# Patient Record
Sex: FEMALE | Race: WHITE | NOT HISPANIC OR LATINO | Employment: FULL TIME | URBAN - METROPOLITAN AREA
[De-identification: names, ages, dates, MRNs, and addresses within clinical notes are randomized per-mention and may not be internally consistent; named-entity substitution may affect disease eponyms.]

---

## 2017-06-05 ENCOUNTER — ALLSCRIPTS OFFICE VISIT (OUTPATIENT)
Dept: OTHER | Facility: OTHER | Age: 67
End: 2017-06-05

## 2018-01-11 NOTE — RESULT NOTES
Verified Results  (1) COMPREHENSIVE METABOLIC PANEL [id number] 25:29AD Teresita Meza   REPORT COMMENT:  FASTING:YES     Test Name Result Flag Reference   GLUCOSE 92 mg/dL  65-99   Fasting reference interval   UREA NITROGEN (BUN) 14 mg/dL  7-25   CREATININE 0 70 mg/dL  0 50-0 99   For patients >52years of age, the reference limit  for Creatinine is approximately 13% higher for people  identified as -American  eGFR NON-AFR  AMERICAN 92 mL/min/1 73m2  > OR = 60   eGFR AFRICAN AMERICAN 106 mL/min/1 73m2  > OR = 60   BUN/CREATININE RATIO   8-42   NOT APPLICABLE (calc)   SODIUM 140 mmol/L  135-146   POTASSIUM 4 2 mmol/L  3 5-5 3   CHLORIDE 104 mmol/L     CARBON DIOXIDE 27 mmol/L  20-31   CALCIUM 9 1 mg/dL  8 6-10 4   PROTEIN, TOTAL 6 4 g/dL  6 1-8 1   ALBUMIN 3 9 g/dL  3 6-5 1   GLOBULIN 2 5 g/dL (calc)  1 9-3 7   ALBUMIN/GLOBULIN RATIO 1 6 (calc)  1 0-2 5   BILIRUBIN, TOTAL 0 5 mg/dL  0 2-1 2   ALKALINE PHOSPHATASE 106 U/L     AST 19 U/L  10-35   ALT 23 U/L  6-29     (1) LIPID PANEL, FASTING 29Oct2016 12:00PM Teresita Meza     Test Name Result Flag Reference   CHOLESTEROL, TOTAL 217 mg/dL H 125-200   HDL CHOLESTEROL 67 mg/dL  > OR = 46   TRIGLICERIDES 81 mg/dL  <665   LDL-CHOLESTEROL 134 mg/dL (calc) H <130   Desirable range <100 mg/dL for patients with CHD or  diabetes and <70 mg/dL for diabetic patients with  known heart disease  CHOL/HDLC RATIO 3 2 (calc)  < OR = 5 0   NON HDL CHOLESTEROL 150 mg/dL (calc)     Target for non-HDL cholesterol is 30 mg/dL higher than   LDL cholesterol target  See Below     We received your handwritten test order and  performed the AMA defined lipid panel  If  this is not what you intended to order, please  contact your local   immediately so that we may adjust our billing  appropriately  You may also inquire about  alternative or additional testing

## 2018-01-13 VITALS
DIASTOLIC BLOOD PRESSURE: 82 MMHG | SYSTOLIC BLOOD PRESSURE: 130 MMHG | WEIGHT: 247.38 LBS | HEIGHT: 61 IN | HEART RATE: 84 BPM | TEMPERATURE: 98.6 F | RESPIRATION RATE: 16 BRPM | BODY MASS INDEX: 46.71 KG/M2

## 2018-02-05 RX ORDER — MELOXICAM 15 MG/1
TABLET ORAL
Qty: 30 TABLET | Refills: 1 | OUTPATIENT
Start: 2018-02-05

## 2018-02-05 NOTE — TELEPHONE ENCOUNTER
1st attempt- left message on patients voice mail to return phone call regarding rx refill and who she sees for PCP  Patient's info states Dr Marco A Baum

## 2018-02-07 DIAGNOSIS — G89.29 OTHER CHRONIC PAIN: Primary | ICD-10-CM

## 2018-02-07 RX ORDER — MELOXICAM 15 MG/1
15 TABLET ORAL DAILY PRN
Qty: 30 TABLET | Refills: 0 | Status: SHIPPED | OUTPATIENT
Start: 2018-02-07 | End: 2018-02-13 | Stop reason: SDUPTHER

## 2018-02-07 RX ORDER — MELOXICAM 15 MG/1
15 TABLET ORAL DAILY PRN
Refills: 1 | COMMUNITY
Start: 2018-01-05 | End: 2018-02-07 | Stop reason: SDUPTHER

## 2018-02-07 NOTE — TELEPHONE ENCOUNTER
Patient called back stating that she is still taking the Meloxicam and would like refill  I scheduled patient appt on 3/1/18 with you

## 2018-02-07 NOTE — TELEPHONE ENCOUNTER
2nd attempt- left message on patients voice mail to return phone call regarding medication refill and schedule appt

## 2018-02-13 ENCOUNTER — OFFICE VISIT (OUTPATIENT)
Dept: FAMILY MEDICINE CLINIC | Facility: CLINIC | Age: 68
End: 2018-02-13
Payer: COMMERCIAL

## 2018-02-13 VITALS
TEMPERATURE: 98.7 F | DIASTOLIC BLOOD PRESSURE: 96 MMHG | SYSTOLIC BLOOD PRESSURE: 154 MMHG | OXYGEN SATURATION: 98 % | BODY MASS INDEX: 46.03 KG/M2 | WEIGHT: 243.8 LBS | HEIGHT: 61 IN | HEART RATE: 88 BPM

## 2018-02-13 DIAGNOSIS — G89.29 OTHER CHRONIC PAIN: ICD-10-CM

## 2018-02-13 DIAGNOSIS — I10 ESSENTIAL HYPERTENSION: ICD-10-CM

## 2018-02-13 DIAGNOSIS — M25.561 ARTHRALGIA OF RIGHT KNEE: ICD-10-CM

## 2018-02-13 DIAGNOSIS — J20.9 BRONCHOSPASM WITH BRONCHITIS, ACUTE: Primary | ICD-10-CM

## 2018-02-13 PROBLEM — IMO0001 CLASS 3 OBESITY WITHOUT SERIOUS COMORBIDITY WITH BODY MASS INDEX (BMI) OF 40.0 TO 44.9 IN ADULT: Status: ACTIVE | Noted: 2018-02-13

## 2018-02-13 PROCEDURE — 99214 OFFICE O/P EST MOD 30 MIN: CPT | Performed by: FAMILY MEDICINE

## 2018-02-13 PROCEDURE — 3008F BODY MASS INDEX DOCD: CPT | Performed by: FAMILY MEDICINE

## 2018-02-13 RX ORDER — MELOXICAM 15 MG/1
1 TABLET ORAL DAILY PRN
COMMUNITY
Start: 2016-10-07 | End: 2018-02-13

## 2018-02-13 RX ORDER — GABAPENTIN 100 MG/1
1 CAPSULE ORAL 3 TIMES DAILY PRN
COMMUNITY
Start: 2015-09-01

## 2018-02-13 RX ORDER — MELOXICAM 15 MG/1
15 TABLET ORAL DAILY PRN
Qty: 30 TABLET | Refills: 0 | Status: SHIPPED | OUTPATIENT
Start: 2018-02-13 | End: 2018-03-01 | Stop reason: ALTCHOICE

## 2018-02-13 RX ORDER — AZITHROMYCIN 250 MG/1
TABLET, FILM COATED ORAL
Qty: 6 TABLET | Refills: 0 | Status: SHIPPED | OUTPATIENT
Start: 2018-02-13 | End: 2018-02-18

## 2018-02-13 RX ORDER — BENAZEPRIL HYDROCHLORIDE AND HYDROCHLOROTHIAZIDE 20; 12.5 MG/1; MG/1
1 TABLET ORAL DAILY
COMMUNITY
Start: 2013-11-05 | End: 2018-03-26 | Stop reason: SDUPTHER

## 2018-02-13 NOTE — PATIENT INSTRUCTIONS
She has 10 days of congestion, increased wheezing here today with no prior history reactive airway disease, nonsmoker  We will cover with azithromycin, use Mucinex every 12 hours, continue with humidity /team at home, use nasal saline, deep breathing  We did discuss adding an  inhaler for bronchodilation which she has never used before, she wishes to hold off on this  If she would develop a fever or if she is not improved in 3 days I would like her to get a chest x-ray  I did give slip for fasting blood work along with urinalysis to do before her up coming scheduled appointment with us  Blood pressure is not ideal here today, is elevated  She has been using benazepril 1/2 pill, I would like her to use 1 whole pill daily  I did refill her meloxicam here today, she uses this few times a week for knee pain  Was OOW 2 days last week w this-   I want her out tomorrow also

## 2018-02-13 NOTE — PROGRESS NOTES
FAMILY PRACTICE OFFICE VISIT      Jovanna CASTILLO O  [address] [place] [address]    [id number]  [address]  [place], [id number]      NAME: Jaime Prince  AGE: 79 y o  SEX: female  : 1950   MRN: [id number]    DATE: 2018  TIME: 5:41 PM    Assessment and Plan     Problem List Items Addressed This Visit     Joint pain, knee    Hypertension    Relevant Medications    benazepril-hydrochlorthiazide (LOTENSIN HCT) 20-12 5 MG per tablet    Other Relevant Orders    CBC and differential    Comprehensive metabolic panel    Lipid panel    TSH, 3rd generation with T4 reflex    Urinalysis with microscopic      Other Visit Diagnoses     Bronchospasm with bronchitis, acute    -  Primary    Relevant Medications    azithromycin (ZITHROMAX) 250 mg tablet    Other Relevant Orders    XR chest pa & lateral    Other chronic pain        Relevant Medications    meloxicam (MOBIC) 15 mg tablet            Patient Instructions    She has 10 days of congestion, increased wheezing here today with no prior history reactive airway disease, nonsmoker  We will cover with azithromycin, use Mucinex every 12 hours, continue with humidity /team at home, use nasal saline, deep breathing  We did discuss adding an  inhaler for bronchodilation which she has never used before, she wishes to hold off on this  If she would develop a fever or if she is not improved in 3 days I would like her to get a chest x-ray  I did give slip for fasting blood work along with urinalysis to do before her up coming scheduled appointment with us  Blood pressure is not ideal here today, is elevated  She has been using benazepril 1/2 pill, I would like her to use 1 whole pill daily  I did refill her meloxicam here today, she uses this few times a week for knee pain  Was OOW 2 days last week w this-   I want her out tomorrow also            Chief Complaint     Chief Complaint   Patient presents with    Cough     x 10 days        History of Present Illness     Logan Dunham is a 79y o -year-old female who Was feeling okay until about 10 days ago on set upper congestion which continues to spike using over-the-counter Robitussin/ Tylenol Sinus  She has no history of asthma or reactive airway disease, no past history of pneumonia, she is a nonsmoker  Works FT as  in [address]  She does use meloxicam every other day or so for knee pain, desires a refill, no stomach upset with this  She is overdue for routine check but relates she is scheduled for March 1st with us  She has been using benazepril hydrochlorothiazide 1/2 pill daily, blood pressure not ideal here today    Only uses Gabapentin as needed      Review of Systems     Review of Systems   Constitutional: Negative for chills, diaphoresis, fatigue and fever  HENT: Positive for congestion, postnasal drip, rhinorrhea and sinus pressure  Negative for ear pain, facial swelling, hearing loss, mouth sores, nosebleeds, sneezing, sore throat and trouble swallowing  Eyes: Negative for pain and discharge  Respiratory: Positive for cough and wheezing  Negative for shortness of breath  Cardiovascular: Negative for chest pain  Gastrointestinal: Negative for abdominal pain, nausea and vomiting  Genitourinary: Negative for difficulty urinating  Musculoskeletal: Positive for arthralgias  Skin: Negative for rash  Neurological: Negative for dizziness, syncope, weakness, light-headedness and headaches  Hematological: Negative for adenopathy  Does not bruise/bleed easily         Active Problem List     Patient Active Problem List   Diagnosis    Allergic rhinitis    Tear of biceps muscle, left, initial encounter    Peripheral neuropathy    OA (osteoarthritis) of finger, left    Joint pain, knee    Hypertension    Heart murmur    Class 3 obesity without serious comorbidity with body mass index (BMI) of 40 0 to 44 9 in adult [facility])       Past Medical History:  No past medical history on file  Past Surgical History:  No past surgical history on file  Family History:  No family history on file  Social History:  Social History     Social History    Marital status:      Spouse name: N/A    Number of children: N/A    Years of education: N/A     Occupational History    Not on file  Social History Main Topics    Smoking status: Never Smoker    Smokeless tobacco: Never Used    Alcohol use No    Drug use: No    Sexual activity: Not on file     Other Topics Concern    Not on file     Social History Narrative    No narrative on file       Objective     Vitals:    02/13/18 1646   BP: 154/96   Pulse: 88   Temp: 98 7 °F (37 1 °C)   SpO2: 98%   Weight: 111 kg (243 lb 12 8 oz)   Height: 5' 1" (1 549 m)     Body mass index is 46 07 kg/m²  BP Readings from Last 3 Encounters:   02/13/18 154/96   06/05/17 130/82   11/16/16 120/80       Wt Readings from Last 3 Encounters:   02/13/18 111 kg (243 lb 12 8 oz)   06/05/17 112 kg (247 lb 6 oz)   11/16/16 110 kg (243 lb 6 oz)       Physical Exam   Constitutional: She is oriented to person, place, and time  She appears well-developed and well-nourished  No distress  Obese afebrile   HENT:   Head: Normocephalic and atraumatic  Mouth/Throat: Oropharynx is clear and moist  No oropharyngeal exudate  Eyes: Conjunctivae are normal  Scleral icterus is present  Cardiovascular: Normal rate and regular rhythm  Murmur (1/6 systolic ejection murmur right 2nd intercostal space) heard  Pulmonary/Chest: Effort normal  No respiratory distress  She has wheezes  Abdominal: There is no tenderness  Musculoskeletal: She exhibits edema  Lymphadenopathy:     She has no cervical adenopathy  Neurological: She is alert and oriented to person, place, and time  Skin: She is not diaphoretic  Psychiatric: She has a normal mood and affect   Her behavior is normal  Judgment and thought content normal        Pertinent Laboratory/Diagnostic Studies:  Results for orders placed or performed in visit on 10/07/16   COMPREHENSIVE METABOLIC PANEL (HISTORICAL)   Result Value Ref Range    Glucose 92 65 - 99 mg/dL    BUN 14 7 - 25 mg/dL    Creatinine 0 70 0 50 - 0 99 mg/dL    EGFR-AMERICAN CALC 92 > OR = 60 mL/min/1 73m2    eGFR  106 > OR = 60 mL/min/1 73m2    BUN/CREA Ratio NOT APPLICABLE 6 - 22 (calc)    Sodium 140 135 - 146 mmol/L    Potassium 4 2 3 5 - 5 3 mmol/L    Chloride 104 98 - 110 mmol/L    CO2 27 20 - 31 mmol/L    Calcium 9 1 8 6 - 10 4 mg/dL    Total Protein 6 4 6 1 - 8 1 g/dL    Albumin 3 9 3 6 - 5 1 g/dL    GAMMA GLOBULIN 2 5 1 9 - 3 7 g/dL (calc)    A/G RATIO 1 6 1 0 - 2 5 (calc)    Total Bilirubin 0 5 0 2 - 1 2 mg/dL    Alkaline Phosphatase 106 33 - 130 U/L    AST 19 10 - 35 U/L    ALT 23 6 - 29 U/L         ALLERGIES:  No Known Allergies    Current Medications     Current Outpatient Prescriptions   Medication Sig Dispense Refill    benazepril-hydrochlorthiazide (LOTENSIN HCT) 20-12 5 MG per tablet Take 1 tablet by mouth daily      gabapentin (NEURONTIN) 100 mg capsule Take 1 capsule by mouth 3 (three) times a day as needed      meloxicam (MOBIC) 15 mg tablet Take 1 tablet (15 mg total) by mouth daily as needed for moderate pain 30 tablet 0    azithromycin (ZITHROMAX) 250 mg tablet 2 today, 1 daily x 4 days after 6 tablet 0     No current facility-administered medications for this visit            Health Maintenance     Orders Placed This Encounter   Procedures    XR chest pa & lateral    CBC and differential    Comprehensive metabolic panel    Lipid panel    TSH, 3rd generation with T4 reflex    Urinalysis with microscopic         Marcella Bernarda,

## 2018-02-18 LAB
ALBUMIN SERPL-MCNC: 3.7 G/DL (ref 3.6–5.1)
ALBUMIN/GLOB SERPL: 1.4 (CALC) (ref 1–2.5)
ALP SERPL-CCNC: 101 U/L (ref 33–130)
ALT SERPL-CCNC: 22 U/L (ref 6–29)
APPEARANCE UR: CLEAR
AST SERPL-CCNC: 17 U/L (ref 10–35)
BACTERIA UR QL AUTO: ABNORMAL /HPF
BASOPHILS # BLD AUTO: 38 CELLS/UL (ref 0–200)
BASOPHILS NFR BLD AUTO: 0.5 %
BILIRUB SERPL-MCNC: 0.4 MG/DL (ref 0.2–1.2)
BILIRUB UR QL STRIP: NEGATIVE
BUN SERPL-MCNC: 20 MG/DL (ref 7–25)
BUN/CREAT SERPL: NORMAL (CALC) (ref 6–22)
CALCIUM SERPL-MCNC: 9.3 MG/DL (ref 8.6–10.4)
CHLORIDE SERPL-SCNC: 101 MMOL/L (ref 98–110)
CHOLEST SERPL-MCNC: 196 MG/DL
CHOLEST/HDLC SERPL: 3.6 (CALC)
CO2 SERPL-SCNC: 26 MMOL/L (ref 20–31)
COLOR UR: YELLOW
CREAT SERPL-MCNC: 0.81 MG/DL (ref 0.5–0.99)
EOSINOPHIL # BLD AUTO: 258 CELLS/UL (ref 15–500)
EOSINOPHIL NFR BLD AUTO: 3.4 %
ERYTHROCYTE [DISTWIDTH] IN BLOOD BY AUTOMATED COUNT: 12.1 % (ref 11–15)
GLOBULIN SER CALC-MCNC: 2.7 G/DL (CALC) (ref 1.9–3.7)
GLUCOSE SERPL-MCNC: 96 MG/DL (ref 65–99)
GLUCOSE UR QL STRIP: NEGATIVE
HCT VFR BLD AUTO: 43.8 % (ref 35–45)
HDLC SERPL-MCNC: 55 MG/DL
HGB BLD-MCNC: 14.8 G/DL (ref 11.7–15.5)
HGB UR QL STRIP: NEGATIVE
HYALINE CASTS #/AREA URNS LPF: ABNORMAL /LPF
KETONES UR QL STRIP: NEGATIVE
LDLC SERPL CALC-MCNC: 122 MG/DL (CALC)
LEUKOCYTE ESTERASE UR QL STRIP: ABNORMAL
LYMPHOCYTES # BLD AUTO: 1938 CELLS/UL (ref 850–3900)
LYMPHOCYTES NFR BLD AUTO: 25.5 %
MCH RBC QN AUTO: 31.7 PG (ref 27–33)
MCHC RBC AUTO-ENTMCNC: 33.8 G/DL (ref 32–36)
MCV RBC AUTO: 93.8 FL (ref 80–100)
MONOCYTES # BLD AUTO: 669 CELLS/UL (ref 200–950)
MONOCYTES NFR BLD AUTO: 8.8 %
NEUTROPHILS # BLD AUTO: 4697 CELLS/UL (ref 1500–7800)
NEUTROPHILS NFR BLD AUTO: 61.8 %
NITRITE UR QL STRIP: NEGATIVE
NONHDLC SERPL-MCNC: 141 MG/DL (CALC)
PH UR STRIP: 7 [PH] (ref 5–8)
PLATELET # BLD AUTO: 290 THOUSAND/UL (ref 140–400)
PMV BLD REES-ECKER: 9.4 FL (ref 7.5–12.5)
POTASSIUM SERPL-SCNC: 3.9 MMOL/L (ref 3.5–5.3)
PROT SERPL-MCNC: 6.4 G/DL (ref 6.1–8.1)
PROT UR QL STRIP: NEGATIVE
RBC # BLD AUTO: 4.67 MILLION/UL (ref 3.8–5.1)
RBC #/AREA URNS HPF: ABNORMAL /HPF
SL AMB EGFR AFRICAN AMERICAN: 88 ML/MIN/1.73M2
SL AMB EGFR NON AFRICAN AMERICAN: 76 ML/MIN/1.73M2
SODIUM SERPL-SCNC: 137 MMOL/L (ref 135–146)
SP GR UR STRIP: 1.01 (ref 1–1.03)
SQUAMOUS #/AREA URNS HPF: ABNORMAL /HPF
TRIGL SERPL-MCNC: 90 MG/DL
TSH SERPL-ACNC: 1.92 MIU/L (ref 0.4–4.5)
WBC # BLD AUTO: 7.6 THOUSAND/UL (ref 3.8–10.8)
WBC #/AREA URNS HPF: ABNORMAL /HPF

## 2018-02-20 ENCOUNTER — TELEPHONE (OUTPATIENT)
Dept: FAMILY MEDICINE CLINIC | Facility: CLINIC | Age: 68
End: 2018-02-20

## 2018-02-20 NOTE — TELEPHONE ENCOUNTER
Notify her I reviewed our recent visit, I gave her a prescription for meloxicam, if this is not helping she would need a re-evaluation, muscle relaxant for leg pain with not be appropriate, she does have upcoming appointment March 1st with Dr Dick Barrier

## 2018-02-20 NOTE — TELEPHONE ENCOUNTER
Patient called to the office this am c/o right leg pain- knee and thigh (unsure for how long patient is having this pain since I did not speak with patient)  Patient is requesting for a muscle relaxer medication   Yosi Gayle offered her an appt, patient declined, "can't come in"  Please advise  Thanks

## 2018-02-21 ENCOUNTER — OFFICE VISIT (OUTPATIENT)
Dept: FAMILY MEDICINE CLINIC | Facility: CLINIC | Age: 68
End: 2018-02-21

## 2018-02-21 VITALS
RESPIRATION RATE: 20 BRPM | SYSTOLIC BLOOD PRESSURE: 144 MMHG | TEMPERATURE: 98.7 F | BODY MASS INDEX: 45.27 KG/M2 | WEIGHT: 239.6 LBS | DIASTOLIC BLOOD PRESSURE: 86 MMHG | HEART RATE: 80 BPM

## 2018-02-21 DIAGNOSIS — M25.561 ARTHRALGIA OF RIGHT KNEE: Primary | ICD-10-CM

## 2018-02-21 PROCEDURE — 20610 DRAIN/INJ JOINT/BURSA W/O US: CPT | Performed by: FAMILY MEDICINE

## 2018-02-21 PROCEDURE — 99213 OFFICE O/P EST LOW 20 MIN: CPT | Performed by: FAMILY MEDICINE

## 2018-02-21 RX ORDER — LIDOCAINE HYDROCHLORIDE 10 MG/ML
3 INJECTION, SOLUTION INFILTRATION; PERINEURAL
Status: COMPLETED | OUTPATIENT
Start: 2018-02-21 | End: 2018-02-21

## 2018-02-21 RX ORDER — TRIAMCINOLONE ACETONIDE 40 MG/ML
40 INJECTION, SUSPENSION INTRA-ARTICULAR; INTRAMUSCULAR
Status: COMPLETED | OUTPATIENT
Start: 2018-02-21 | End: 2018-02-21

## 2018-02-21 RX ADMIN — TRIAMCINOLONE ACETONIDE 40 MG: 40 INJECTION, SUSPENSION INTRA-ARTICULAR; INTRAMUSCULAR at 10:35

## 2018-02-21 RX ADMIN — LIDOCAINE HYDROCHLORIDE 3 ML: 10 INJECTION, SOLUTION INFILTRATION; PERINEURAL at 10:35

## 2018-02-21 NOTE — PROGRESS NOTES
Assessment/Plan:    Joint pain, knee  The patient has an extensive history of a patellar fracture status post surgery and she does have acute on chronic pain today, she had a small right-sided joint effusion, she was injected with 40 mg of Kenalog and 3 mg of 1% lidocaine, and we will refer her to physical therapy  I did advise her to stop taking Motrin and try Mobic once daily with a meal, she has this at home  For now I'd like to re-evaluate her in about 1 week and we will keep her out of work per her preference because of her inability to perform her job duties until she is re-evaluated  I did tell the patient that in order for us to keep her out from work I believe that she ought to participate in physical therapy, and based on their evaluation and the progress she is able to make we can attempt to determine her recovery course   Diagnoses and all orders for this visit:    Arthralgia of right knee  -     Large joint arthrocentesis  -     Ambulatory referral to Physical Therapy; Future          Subjective:      Patient ID: Angela Age is a 79 y o  female  Knee Pain    The incident occurred 2 days ago (acute on chronic)  The pain is present in the right knee (Radiating to R thigh)  The quality of the pain is described as aching, cramping and shooting  The pain is moderate  Associated symptoms include an inability to bear weight  Pertinent negatives include no loss of sensation, muscle weakness, numbness or tingling  She reports no foreign bodies present  The symptoms are aggravated by movement and weight bearing  Treatments tried: mobic  The patient reports she is here to be written out of work for 3-6 weeks so that she can get better   She is very resistant to considering participating in physical therapy but is willing to have her knee injected--her last injection was several years ago with her orthopedist  She does have a history of patellar fracture which was surgically repaired and she is worried that this is part of the underlying problem  During her history giving she is very anxious and tearful at times  The following portions of the patient's history were reviewed and updated as appropriate: allergies, current medications, past family history, past medical history, past social history, past surgical history and problem list     Review of Systems   Constitutional: Positive for activity change and fatigue  HENT:        Resolving bronchitis   Respiratory: Negative for shortness of breath and wheezing  Cardiovascular: Negative for chest pain and palpitations  Gastrointestinal: Negative  Musculoskeletal: Positive for arthralgias, gait problem and joint swelling  Neurological: Negative for dizziness, tingling, light-headedness and numbness  Psychiatric/Behavioral:        Tearful and anxious         Objective:      /86   Pulse 80   Temp 98 7 °F (37 1 °C)   Resp 20   Wt 109 kg (239 lb 9 6 oz)   BMI 45 27 kg/m²          Physical Exam   Constitutional: She is oriented to person, place, and time  She appears well-developed and well-nourished  No distress  Morbidly obese   HENT:   Head: Normocephalic and atraumatic  Mouth/Throat: Oropharynx is clear and moist  No oropharyngeal exudate  Eyes: EOM are normal  Pupils are equal, round, and reactive to light  Neck: Normal range of motion  Cardiovascular: Normal rate, regular rhythm and normal heart sounds  Pulmonary/Chest: Effort normal and breath sounds normal  No respiratory distress  She has no wheezes  Abdominal: Soft  She exhibits no distension  There is no tenderness  There is no rebound and no guarding  Obese   Musculoskeletal:   Very small right knee joint effusion, crepitus with range of motion, range of motion appears to be intact  Tenderness to palpation of right iliotibial band especially at the mid thigh  Neurological: She is alert and oriented to person, place, and time  Skin: Skin is warm and dry  No rash noted  She is not diaphoretic  Well healed surgical scar across right knee   Psychiatric:   Anxious, tearful   Vitals reviewed        Large joint arthrocentesis  Date/Time: 2/21/2018 10:35 AM  Consent given by: patient  Site marked: site marked  Timeout: Immediately prior to procedure a time out was called to verify the correct patient, procedure, equipment, support staff and site/side marked as required   Supporting Documentation  Indications: pain and joint swelling   Procedure Details  Location: knee - R knee  Preparation: Patient was prepped and draped in the usual sterile fashion  Needle size: 22 G  Ultrasound guidance: no  Approach: anterolateral  Medications administered: 40 mg triamcinolone acetonide 40 mg/mL; 3 mL lidocaine 1 %    Aspirate amount: 0 mL  Patient tolerance: patient tolerated the procedure well with no immediate complications  Dressing:  Sterile dressing applied

## 2018-02-21 NOTE — PATIENT INSTRUCTIONS
Joint pain, knee  The patient has an extensive history of a patellar fracture status post surgery and she does have acute on chronic pain today, she had a small right-sided joint effusion, she was injected with 40 mg of Kenalog and 3 mg of 1% lidocaine, and we will refer her to physical therapy  I did advise her to stop taking Motrin and try Mobic once daily with a meal, she has this at home  For now I'd like to re-evaluate her in about 1 week and we will keep her out of work per her preference because of her inability to perform her job duties until she is re-evaluated

## 2018-02-21 NOTE — LETTER
February 21, 2018     Patient: Claudine Vu   YOB: 1950   Date of Visit: 2/21/2018       To Whom it May Concern:    Garth Mosher is under my professional care  She was seen in my office on 2/21/2018  She may not return to work until re-evaluated by a physician and having been seen by physical therapy  Next appt to be 3/1/18  If you have any questions or concerns, please don't hesitate to call           Sincerely,          Sarai Pereira MD        CC: No Recipients

## 2018-02-21 NOTE — TELEPHONE ENCOUNTER
Spoke with patient, she made appt to be seen today by Dr Vivi Brownlee to be re eval , patient is aware of Mark's recommendations for her- patient states she is missing a lot of points at work and needs a work excuse

## 2018-02-21 NOTE — ASSESSMENT & PLAN NOTE
The patient has an extensive history of a patellar fracture status post surgery and she does have acute on chronic pain today, she had a small right-sided joint effusion, she was injected with 40 mg of Kenalog and 3 mg of 1% lidocaine, and we will refer her to physical therapy  I did advise her to stop taking Motrin and try Mobic once daily with a meal, she has this at home  For now I'd like to re-evaluate her in about 1 week and we will keep her out of work per her preference because of her inability to perform her job duties until she is re-evaluated

## 2018-02-26 ENCOUNTER — EVALUATION (OUTPATIENT)
Dept: PHYSICAL THERAPY | Facility: OTHER | Age: 68
End: 2018-02-26
Payer: COMMERCIAL

## 2018-02-26 DIAGNOSIS — M25.561 ARTHRALGIA OF RIGHT KNEE: Primary | ICD-10-CM

## 2018-02-26 PROCEDURE — G8979 MOBILITY GOAL STATUS: HCPCS | Performed by: PHYSICAL THERAPIST

## 2018-02-26 PROCEDURE — G8978 MOBILITY CURRENT STATUS: HCPCS | Performed by: PHYSICAL THERAPIST

## 2018-02-26 PROCEDURE — 97163 PT EVAL HIGH COMPLEX 45 MIN: CPT | Performed by: PHYSICAL THERAPIST

## 2018-02-26 NOTE — PROGRESS NOTES
PT Evaluation     Today's date: 2018  Patient name: Mary Murrell  : 1950  MRN: [id number]  Referring provider: Maxx Gore MD  Dx:   Encounter Diagnosis     ICD-10-CM    1  Arthralgia of right knee M25 561 Ambulatory referral to Physical Therapy       Start Time:   Stop Time:   Total time in clinic (min): 60 minutes    Assessment  Impairments: abnormal gait, abnormal or restricted ROM, activity intolerance, impaired balance, impaired physical strength, lacks appropriate home exercise program, pain with function and weight-bearing intolerance    Assessment details: Mary Murrell is a 79 y o  female who presents with arthralgia of right knee  (primary encounter diagnosis)  No further referral appears necessary at this time based upon examination results  Patient presents to PT with impaired strength, impaired ROM, decreased flexibility and impaired ability to complete IADLs  Prognosis is good given HEP compliance and PT 2-3x/wk  Positive prognostic indicators include her desire to return back to work  Please contact me if you have any questions or recommendations  Thank you for the opportunity to share in Charlette's care       Barriers to therapy: depression, attitude, obesity, socioeconomic status, insurance   Understanding of Dx/Px/POC: fair   Prognosis: fair    Goals  Short Term:  Pt will report decreased levels of pain by at least 2 subjective ratings in 4 weeks  Pt will demonstrate improved ROM by at least 10 degrees in 4 weeks  Pt will demonstrate improved strength by 1/2 grade  Long Term:   Pt will be independent in their HEP in 8 weeks  Pt will be be pain free with IADL's  Pt will demonstrate improved FOTO score   Patient's Goal:  "I want to return to work ASAP"    Plan  Planned modality interventions: cryotherapy and TENS  Planned therapy interventions: abdominal trunk stabilization, balance, home exercise program, flexibility, functional ROM exercises, gait training, therapeutic exercise, therapeutic activities, stretching, strengthening, manual therapy and joint mobilization  Frequency: 1x week  Duration in weeks: 4        Subjective Evaluation    History of Present Illness  Date of onset: 2018  Mechanism of injury: Patient reports last Tuesday she was at work and she started to feel pain into the R hip  Patient reports she had a lot of difficulty walking out of her job that day  She has since seen her family doctor  She was given an injection that day  Over the weekend she went to urgent care due to the pain  They performed an x-ray  X-ray came back negative  She has a follow up appointment with MD on Friday  She will be going for more x-rays today at 1:00PM of her hip and low back  She keeps telling PT she is depressed because she is out of work and she has a lot of pain      Not a recurrent problem Quality of life: poor    Pain  Current pain ratin  At best pain ratin  At worst pain rating: 10  Location: right hip   Quality: throbbing, burning and sharp  Relieving factors: rest, heat, ice and change in position  Aggravating factors: sitting, standing, stair climbing, walking, lifting and running      Diagnostic Tests  X-ray: normal  Treatments  Previous treatment: injection treatment and medication  Current treatment: physical therapy  Patient Goals  Patient goals for therapy: decreased edema, decreased pain, improved balance, increased motion, increased strength, return to work and independence with ADLs/IADLs  Patient goal: "I want to return to work ASAP"        Objective     Observations     Additional Observation Details  Ecchymosis noted in the R hip  No known cause for ecchymosis     Palpation     Additional Palpation Details  Tenderness noted in the R SI    Active Range of Motion     Lumbar   Flexion: WFL  Extension: WFL  Left lateral flexion: WFL  Right lateral flexion: WFL  Left rotation: [facility]  Right rotation: [facility]    Additional Active Range of Motion Details  Pain with lateral flexion to the L   Pain with rotation to the L     Passive Range of Motion     Right Knee   Flexion: 110 degrees     Strength/Myotome Testing     Left Hip   Planes of Motion   Flexion: 4  Left hip extension strength: not able to test   Abduction: 4  Adduction: 5    Right Hip   Planes of Motion   Flexion: 3+  Right hip extension strength: not able to test   Abduction: 3+  Adduction: 5    Left Knee   Flexion: 4  Extension: 4    Right Knee   Flexion: 3+  Extension: 3+    Tests     Right Hip   Positive LUIS M and scour  Negative FADIR and piriformis       Flowsheet Rows    Flowsheet Row Most Recent Value   PT/OT G-Codes   Current Score  30   Projected Score  62   FOTO information reviewed  Yes   Assessment Type  Evaluation   G code set  Mobility: Walking & Moving Around   Mobility: Walking and Moving Around Current Status ()  CL   Mobility: Walking and Moving Around Goal Status ()  CK        Precautions: anxiety or depression disorder, arthritis, BMI over 30, HTN, sleep dysfunction    Daily Treatment Diary     Manual  2/26            R hip PROM             Piriformis STM                                                        Exercise Diary  2/26            Robert              SLR abduction             Standing SLR x 3             Hamstring strap stretch                                                                                                                                                                                                                                 Modalities  2/26            Eitan WHITTEN

## 2018-03-01 ENCOUNTER — TELEPHONE (OUTPATIENT)
Dept: INTERNAL MEDICINE CLINIC | Facility: CLINIC | Age: 68
End: 2018-03-01

## 2018-03-01 ENCOUNTER — OFFICE VISIT (OUTPATIENT)
Dept: INTERNAL MEDICINE CLINIC | Facility: CLINIC | Age: 68
End: 2018-03-01
Payer: COMMERCIAL

## 2018-03-01 VITALS
SYSTOLIC BLOOD PRESSURE: 120 MMHG | RESPIRATION RATE: 16 BRPM | BODY MASS INDEX: 44.1 KG/M2 | TEMPERATURE: 98.5 F | WEIGHT: 233.6 LBS | OXYGEN SATURATION: 98 % | HEIGHT: 61 IN | DIASTOLIC BLOOD PRESSURE: 70 MMHG | HEART RATE: 106 BPM

## 2018-03-01 DIAGNOSIS — G60.9 IDIOPATHIC PERIPHERAL NEUROPATHY: Primary | ICD-10-CM

## 2018-03-01 PROCEDURE — 99213 OFFICE O/P EST LOW 20 MIN: CPT | Performed by: INTERNAL MEDICINE

## 2018-03-01 NOTE — LETTER
March 1, 2018     Patient: Kevin Díaz   YOB: 1950   Date of Visit: 3/1/2018       To Whom it May Concern:    Tonny Ellis is under my professional care  She was seen in my office on 3/1/2018  She may return to work with limitations 3/15/18  If you have any questions or concerns, please don't hesitate to call           Sincerely,          Hugo Che,         CC: No Recipients

## 2018-03-01 NOTE — TELEPHONE ENCOUNTER
Could you see If you could get records from [facility] at [facility] for our notes?  Thanks

## 2018-03-05 ENCOUNTER — TELEPHONE (OUTPATIENT)
Dept: INTERNAL MEDICINE CLINIC | Facility: CLINIC | Age: 68
End: 2018-03-05

## 2018-03-05 NOTE — TELEPHONE ENCOUNTER
She got a note for me to be out for 2 weeks  At that point she is suppose to see her PCP because I am not sure what her restrictions will be at that time so I cant really do this at this point  She is not working right now

## 2018-03-06 NOTE — TELEPHONE ENCOUNTER
Patient called back, she stated that she seen a provider at [facility], he wanted to do an injection in her back but patient declined it  Patient stated that she called them this morning for appt to have the injection this afternoon  Advised patient when she is seen today to have the provider send her records to the office  Patient stated that she is not transferring to our office  I advised her we do not have the documentation to fill out any forms for her employer

## 2018-03-06 NOTE — TELEPHONE ENCOUNTER
1st attempt- left message on patients voice mail to return phone call regarding records  Patient needs to call [facility] to give permission to send records

## 2018-03-09 ENCOUNTER — OFFICE VISIT (OUTPATIENT)
Dept: PHYSICAL THERAPY | Facility: OTHER | Age: 68
End: 2018-03-09

## 2018-03-09 NOTE — PROGRESS NOTES
PT performed Initial Evaluation  PT tried to have patient attend several sessions but she was not having it  Patient called to cancel and informed PT that she no longer wants to attend PT because she had a cortisone shot in her R hip which alleviated her pain    Thank you for your referral

## 2018-03-26 DIAGNOSIS — I10 ESSENTIAL HYPERTENSION: Primary | ICD-10-CM

## 2018-03-26 RX ORDER — BENAZEPRIL HYDROCHLORIDE AND HYDROCHLOROTHIAZIDE 20; 12.5 MG/1; MG/1
TABLET ORAL
Qty: 90 TABLET | Refills: 1 | Status: SHIPPED | OUTPATIENT
Start: 2018-03-26 | End: 2018-11-07 | Stop reason: SDUPTHER

## 2018-04-08 DIAGNOSIS — G89.29 OTHER CHRONIC PAIN: ICD-10-CM

## 2018-04-09 RX ORDER — MELOXICAM 15 MG/1
15 TABLET ORAL DAILY PRN
Qty: 30 TABLET | Refills: 0 | Status: SHIPPED | OUTPATIENT
Start: 2018-04-09 | End: 2018-10-03

## 2018-05-22 ENCOUNTER — TELEPHONE (OUTPATIENT)
Dept: FAMILY MEDICINE CLINIC | Facility: CLINIC | Age: 68
End: 2018-05-22

## 2018-05-22 NOTE — TELEPHONE ENCOUNTER
CEASARI: Home care just wanted to let you know that they saw her today for her start of care, since her knee replacement  They will be sending over the orders for you to sign

## 2018-05-23 NOTE — TELEPHONE ENCOUNTER
This therapy is typically initiated by the discharging physician and then she will often need a face-to-face visit to have ongoing care  Please make sure she has a transition of care visit

## 2018-05-23 NOTE — TELEPHONE ENCOUNTER
Called patient she said she did not want to schedule an appointment  She doesn't understand why she needs to come in, when I explained this to her she said she cannot drive right now and she does not have a way there and asked me how she is expected to show up for these appointments  She said she has PT and home care  How would you like to proceed?

## 2018-05-25 NOTE — TELEPHONE ENCOUNTER
PT should be ordered by the physician who performed her knee replacement  We cannot order physical therapy for procedures that we did not perform  Please checking with her performing orthopedist and have their office take care of this

## 2018-10-03 ENCOUNTER — OFFICE VISIT (OUTPATIENT)
Dept: FAMILY MEDICINE CLINIC | Facility: CLINIC | Age: 68
End: 2018-10-03
Payer: COMMERCIAL

## 2018-10-03 VITALS — HEART RATE: 95 BPM | OXYGEN SATURATION: 96 % | BODY MASS INDEX: 43.61 KG/M2 | HEIGHT: 62 IN | WEIGHT: 237 LBS

## 2018-10-03 DIAGNOSIS — E66.01 MORBID OBESITY (HCC): ICD-10-CM

## 2018-10-03 DIAGNOSIS — Z13.220 SCREENING FOR HYPERLIPIDEMIA: ICD-10-CM

## 2018-10-03 DIAGNOSIS — Z12.11 SCREENING FOR COLON CANCER: ICD-10-CM

## 2018-10-03 DIAGNOSIS — M25.561 ARTHRALGIA OF RIGHT KNEE: ICD-10-CM

## 2018-10-03 DIAGNOSIS — Z23 NEED FOR PNEUMOCOCCAL VACCINATION: ICD-10-CM

## 2018-10-03 DIAGNOSIS — I10 ESSENTIAL HYPERTENSION: Primary | ICD-10-CM

## 2018-10-03 DIAGNOSIS — Z11.59 NEED FOR HEPATITIS C SCREENING TEST: ICD-10-CM

## 2018-10-03 DIAGNOSIS — M25.511 ACUTE PAIN OF RIGHT SHOULDER: ICD-10-CM

## 2018-10-03 PROCEDURE — 90471 IMMUNIZATION ADMIN: CPT | Performed by: FAMILY MEDICINE

## 2018-10-03 PROCEDURE — 1160F RVW MEDS BY RX/DR IN RCRD: CPT | Performed by: FAMILY MEDICINE

## 2018-10-03 PROCEDURE — 20605 DRAIN/INJ JOINT/BURSA W/O US: CPT | Performed by: FAMILY MEDICINE

## 2018-10-03 PROCEDURE — 90670 PCV13 VACCINE IM: CPT | Performed by: FAMILY MEDICINE

## 2018-10-03 PROCEDURE — 1036F TOBACCO NON-USER: CPT | Performed by: FAMILY MEDICINE

## 2018-10-03 PROCEDURE — 99214 OFFICE O/P EST MOD 30 MIN: CPT | Performed by: FAMILY MEDICINE

## 2018-10-03 RX ORDER — TRIAMCINOLONE ACETONIDE 40 MG/ML
40 INJECTION, SUSPENSION INTRA-ARTICULAR; INTRAMUSCULAR
Status: COMPLETED | OUTPATIENT
Start: 2018-10-03 | End: 2018-10-03

## 2018-10-03 RX ORDER — CELECOXIB 50 MG/1
50 CAPSULE ORAL 2 TIMES DAILY
COMMUNITY

## 2018-10-03 RX ORDER — LIDOCAINE HYDROCHLORIDE 10 MG/ML
1 INJECTION, SOLUTION INFILTRATION; PERINEURAL
Status: COMPLETED | OUTPATIENT
Start: 2018-10-03 | End: 2018-10-03

## 2018-10-03 RX ADMIN — TRIAMCINOLONE ACETONIDE 40 MG: 40 INJECTION, SUSPENSION INTRA-ARTICULAR; INTRAMUSCULAR at 13:07

## 2018-10-03 RX ADMIN — LIDOCAINE HYDROCHLORIDE 1 ML: 10 INJECTION, SOLUTION INFILTRATION; PERINEURAL at 13:07

## 2018-10-03 NOTE — ASSESSMENT & PLAN NOTE
Recommended addition of Aspercreme with 4% lidocaine for the knee pain  She will follow up with her orthopedic surgeon in December

## 2018-10-03 NOTE — PROGRESS NOTES
Assessment/Plan:    Hypertension  Blood pressure is under good control with current regimen  Acute pain of right shoulder  She was given injection today  Recommended use of topical such as Aspercreme with 4% lidocaine  Joint pain, knee  Recommended addition of Aspercreme with 4% lidocaine for the knee pain  She will follow up with her orthopedic surgeon in December  Morbid obesity ([facility] [address] )  Offered referral to medical weight management, but she declines  Diagnoses and all orders for this visit:    Essential hypertension    Acute pain of right shoulder    Arthralgia of right knee    Morbid obesity ([facility] )    Need for pneumococcal vaccination  -     PNEUMOCOCCAL CONJUGATE VACCINE 13-VALENT GREATER THAN 6 MONTHS    Screening for hyperlipidemia  -     Lipid panel; Future  -     Comprehensive metabolic panel; Future    Need for hepatitis C screening test  -     Hepatitis C antibody; Future    Screening for colon cancer  -     Occult Blood, Fecal Immunochemical; Future    Other orders  -     Cancel: influenza vaccine, [id number], high-dose, PF 0 5 mL, for patients 65 yr+ (FLUZONE HIGH-DOSE)  -     celecoxib (CeleBREX) 50 MG capsule; Take 50 mg by mouth 2 (two) times a day          Subjective:      Patient ID: Luis Moore is a 79 y o  female  She is here for follow up  She finished therapy for right knee  She c/o chronic pain right knee she finished physical therapy for the right knee few months ago  She has a long history of pain in the knee with history of fracture and the knee replacement  She sees Dr Vanessa Lott for her knee  She also has right shoulder pain, no injury  Pain began about 1 month ago    Also hx peripheral neuropathy and lumbar stenosis        The following portions of the patient's history were reviewed and updated as appropriate: allergies, current medications, past family history, past medical history, past social history, past surgical history and problem list     Review of Systems   Constitutional: Negative for activity change, chills, fatigue and fever  HENT: Negative for congestion, ear pain, sinus pressure and sore throat  Eyes: Negative for pain and visual disturbance  Respiratory: Negative for cough, chest tightness, shortness of breath and wheezing  Cardiovascular: Negative for chest pain, palpitations and leg swelling  Gastrointestinal: Negative for abdominal pain, blood in stool, constipation, diarrhea, nausea and vomiting  Endocrine: Negative for polydipsia and polyuria  Genitourinary: Negative for difficulty urinating, dysuria, frequency and urgency  Musculoskeletal: Positive for arthralgias and back pain  Negative for joint swelling and myalgias  Skin: Negative for rash  Neurological: Negative for dizziness, weakness, numbness and headaches  Hematological: Negative for adenopathy  Does not bruise/bleed easily  Psychiatric/Behavioral: Negative for dysphoric mood  The patient is not nervous/anxious  Objective:      Pulse 95   Ht 5' 2" (1 575 m)   Wt 108 kg (237 lb)   SpO2 96%   BMI 43 35 kg/m²          Physical Exam   Constitutional: She is oriented to person, place, and time  She appears well-developed and well-nourished  Morbidly obese   Neck: Normal range of motion  Neck supple  No thyromegaly present  Cardiovascular: Normal rate, regular rhythm and normal heart sounds  Pulmonary/Chest: Effort normal and breath sounds normal    Musculoskeletal: Normal range of motion  Right shoulder is tender in the subacromial bursa region  There is also some posterior tenderness and positive beck impingement sign  Neg empty cans  Neg drop arm test   Lymphadenopathy:     She has no cervical adenopathy  Neurological: She is alert and oriented to person, place, and time  Skin: Skin is warm and dry  Nursing note and vitals reviewed      Medium joint arthrocentesis  Date/Time: 10/3/2018 1:07 PM  Consent given by: patient  Site marked: site marked  Timeout: Immediately prior to procedure a time out was called to verify the correct patient, procedure, equipment, support staff and site/side marked as required   Supporting Documentation  Indications: pain   Procedure Details  Location: shoulder -   Preparation: Patient was prepped and draped in the usual sterile fashion  Needle size: 22 G  Ultrasound guidance: no  Approach: posterior  Medications administered: 1 mL lidocaine 1 %; 40 mg triamcinolone acetonide 40 mg/mL    Patient tolerance: patient tolerated the procedure well with no immediate complications  Dressing:  Sterile dressing applied

## 2018-10-05 ENCOUNTER — APPOINTMENT (OUTPATIENT)
Dept: LAB | Facility: CLINIC | Age: 68
End: 2018-10-05
Payer: COMMERCIAL

## 2018-10-05 DIAGNOSIS — Z11.59 NEED FOR HEPATITIS C SCREENING TEST: ICD-10-CM

## 2018-10-05 DIAGNOSIS — Z13.220 SCREENING FOR HYPERLIPIDEMIA: ICD-10-CM

## 2018-10-05 LAB
ALBUMIN SERPL BCP-MCNC: 3.3 G/DL (ref 3.5–5)
ALP SERPL-CCNC: 133 U/L (ref 46–116)
ALT SERPL W P-5'-P-CCNC: 26 U/L (ref 12–78)
ANION GAP SERPL CALCULATED.3IONS-SCNC: 6 MMOL/L (ref 4–13)
AST SERPL W P-5'-P-CCNC: 14 U/L (ref 5–45)
BILIRUB SERPL-MCNC: 0.34 MG/DL (ref 0.2–1)
BUN SERPL-MCNC: 17 MG/DL (ref 5–25)
CALCIUM SERPL-MCNC: 8.9 MG/DL (ref 8.3–10.1)
CHLORIDE SERPL-SCNC: 102 MMOL/L (ref 100–108)
CHOLEST SERPL-MCNC: 191 MG/DL (ref 50–200)
CO2 SERPL-SCNC: 28 MMOL/L (ref 21–32)
CREAT SERPL-MCNC: 0.72 MG/DL (ref 0.6–1.3)
GFR SERPL CREATININE-BSD FRML MDRD: 88 ML/MIN/1.73SQ M
GLUCOSE P FAST SERPL-MCNC: 85 MG/DL (ref 65–99)
HDLC SERPL-MCNC: 63 MG/DL (ref 40–60)
LDLC SERPL CALC-MCNC: 108 MG/DL (ref 0–100)
NONHDLC SERPL-MCNC: 128 MG/DL
POTASSIUM SERPL-SCNC: 3.9 MMOL/L (ref 3.5–5.3)
PROT SERPL-MCNC: 7.2 G/DL (ref 6.4–8.2)
SODIUM SERPL-SCNC: 136 MMOL/L (ref 136–145)
TRIGL SERPL-MCNC: 98 MG/DL

## 2018-10-05 PROCEDURE — 86803 HEPATITIS C AB TEST: CPT

## 2018-10-05 PROCEDURE — 80061 LIPID PANEL: CPT

## 2018-10-05 PROCEDURE — 80053 COMPREHEN METABOLIC PANEL: CPT

## 2018-10-05 PROCEDURE — 36415 COLL VENOUS BLD VENIPUNCTURE: CPT

## 2018-10-06 LAB — HCV AB SER QL: NORMAL

## 2018-11-07 DIAGNOSIS — I10 ESSENTIAL HYPERTENSION: ICD-10-CM

## 2018-11-07 RX ORDER — BENAZEPRIL HYDROCHLORIDE AND HYDROCHLOROTHIAZIDE 20; 12.5 MG/1; MG/1
1 TABLET ORAL DAILY
Qty: 90 TABLET | Refills: 2 | Status: SHIPPED | OUTPATIENT
Start: 2018-11-07 | End: 2019-08-11 | Stop reason: SDUPTHER

## 2019-08-11 DIAGNOSIS — I10 ESSENTIAL HYPERTENSION: ICD-10-CM

## 2019-08-11 RX ORDER — BENAZEPRIL HYDROCHLORIDE AND HYDROCHLOROTHIAZIDE 20; 12.5 MG/1; MG/1
TABLET ORAL
Qty: 90 TABLET | Refills: 2 | Status: SHIPPED | OUTPATIENT
Start: 2019-08-11

## 2023-09-28 NOTE — PROGRESS NOTES
Assessment/Plan:    No problem-specific Assessment & Plan notes found for this encounter  Diagnoses and all orders for this visit:    Idiopathic peripheral neuropathy      Sudheer Horton continues to have pain involving the right leg with associated numbness/tingling  It appears more to be related to a neuropathy but I cannot be certain  We should attempt to get notes from Dr Michael March as he did look at films  She will continue with PT and has follow up scheduled with Dr Akiko Klein her orthopedist  In terms of her pain, I advised that she can take the anti-inflammatory (motrin) along with the gabapentin  She is going to increase the Gabapentin to 200 mg at bedtime (100 mg during the day secondary to fatigue)  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  No other changes were made  Subjective:      Patient ID: Liana Garnett is a 79 y o  female  I am seeing Sudheer Horton today for a follow up  She is a little confused why she is here and would ultimately still like to follow with Dr Luis A Navas  To review, she has had a history of patellar trauma of the right knee that would give her occasional pain  She was fully active and only limitations at work were with lifting  Recently, she reports being at work and all of the sudden she started to note right sided hip and knee pain  It caused difficulty with walking  She denies any trauma or falls  She was seen by Dr Isis Hazel and did get a local knee injection  This did not help  She was then seen by an Ur[facility] Care [facility] and reports having xrays completed  She was told they were fine and thought she had bursitis of the right hip  She was given prednisone for 7 days  Pain continued and she was seen by Dr Michael March  He injected the right hip but she has again had no relief  Symptoms include numbness/tingling of the right side  She has been taking the gabapentin but nothing else   She started with PT and will be returning [facility]   tomorrow to them as well  She is really worried as she would like to return to work  The following portions of the patient's history were reviewed and updated as appropriate: current medications, past family history, past medical history, past social history, past surgical history and problem list     Review of Systems   Constitutional: Negative for fatigue, fever and unexpected weight change  Gastrointestinal: Negative for abdominal pain, constipation and diarrhea  Genitourinary: Negative for decreased urine volume and difficulty urinating  Musculoskeletal: Positive for arthralgias, back pain and gait problem  Neurological: Positive for numbness  Objective:      /70 (BP Location: Left arm, Patient Position: Sitting, Cuff Size: Large)   Pulse (!) 106   Temp 98 5 °F (36 9 °C) (Tympanic)   Resp 16   Ht 5' 1" (1 549 m)   Wt 106 kg (233 lb 9 6 oz)   SpO2 98%   BMI 44 14 kg/m²          Physical Exam   Constitutional: She is oriented to person, place, and time  She appears well-developed and well-nourished  obese   Cardiovascular: Normal rate, regular rhythm and normal heart sounds  Pulmonary/Chest: Effort normal and breath sounds normal  No respiratory distress  Musculoskeletal: She exhibits tenderness  She exhibits no edema or deformity  Strength intact bilaterally  Difficulty when weight bearing on this side  Neurological: She is alert and oriented to person, place, and time  Skin: Skin is dry  Bruising noted on the right hip region    Psychiatric: She has a normal mood and affect   Her behavior is normal  Judgment and thought content normal